# Patient Record
(demographics unavailable — no encounter records)

---

## 2025-04-02 NOTE — PHYSICAL EXAM
[FreeTextEntry3] :  Gen:                        Well appearing, well nourished, NAD. Skin:                       Eccrine:                 Within normal limits   Face:                      Neck:                      Chest:                                    UE:                       Neuro:                     No focal deficits. Age appropriate. Psych:                     Appropriate affect.

## 2025-04-02 NOTE — HISTORY OF PRESENT ILLNESS
[FreeTextEntry1] : sweating, hives/dermatitis on neck [de-identified] : sweating, hives/dermatitis on neck

## 2025-04-02 NOTE — ASSESSMENT
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 Proper use and SE meds disc incl risk of cutaneous atrophy  #hyperhidorisis -axillae -start qbrexa wipes -OTC alternative discussed: certain dri nightly -if refractory, options available at other locations could consider botox, miradry